# Patient Record
Sex: FEMALE | Race: BLACK OR AFRICAN AMERICAN
[De-identification: names, ages, dates, MRNs, and addresses within clinical notes are randomized per-mention and may not be internally consistent; named-entity substitution may affect disease eponyms.]

---

## 2019-09-06 ENCOUNTER — HOSPITAL ENCOUNTER (EMERGENCY)
Dept: HOSPITAL 53 - M ED | Age: 24
Discharge: HOME | End: 2019-09-06
Payer: COMMERCIAL

## 2019-09-06 VITALS — HEIGHT: 67 IN | BODY MASS INDEX: 36.61 KG/M2 | WEIGHT: 233.25 LBS

## 2019-09-06 VITALS — DIASTOLIC BLOOD PRESSURE: 76 MMHG | SYSTOLIC BLOOD PRESSURE: 127 MMHG

## 2019-09-06 DIAGNOSIS — H60.92: Primary | ICD-10-CM

## 2019-09-08 ENCOUNTER — HOSPITAL ENCOUNTER (EMERGENCY)
Dept: HOSPITAL 53 - M ED | Age: 24
Discharge: HOME | End: 2019-09-08
Payer: COMMERCIAL

## 2019-09-08 VITALS — WEIGHT: 231.71 LBS | BODY MASS INDEX: 36.37 KG/M2 | HEIGHT: 67 IN

## 2019-09-08 VITALS — DIASTOLIC BLOOD PRESSURE: 81 MMHG | SYSTOLIC BLOOD PRESSURE: 128 MMHG

## 2019-09-08 DIAGNOSIS — H60.92: Primary | ICD-10-CM

## 2019-09-08 DIAGNOSIS — Z79.899: ICD-10-CM

## 2019-09-08 DIAGNOSIS — Z79.2: ICD-10-CM

## 2019-09-10 ENCOUNTER — HOSPITAL ENCOUNTER (EMERGENCY)
Dept: HOSPITAL 53 - M ED | Age: 24
Discharge: HOME | End: 2019-09-10
Payer: COMMERCIAL

## 2019-09-10 VITALS — SYSTOLIC BLOOD PRESSURE: 134 MMHG | DIASTOLIC BLOOD PRESSURE: 86 MMHG

## 2019-09-10 VITALS — BODY MASS INDEX: 34.6 KG/M2 | HEIGHT: 67 IN | WEIGHT: 220.46 LBS

## 2019-09-10 DIAGNOSIS — H60.92: ICD-10-CM

## 2019-09-10 DIAGNOSIS — Z79.2: ICD-10-CM

## 2019-09-10 DIAGNOSIS — H92.01: Primary | ICD-10-CM

## 2019-09-10 DIAGNOSIS — Z79.899: ICD-10-CM

## 2020-05-07 ENCOUNTER — HOSPITAL ENCOUNTER (EMERGENCY)
Dept: HOSPITAL 53 - M ED | Age: 25
Discharge: HOME | End: 2020-05-07
Payer: COMMERCIAL

## 2020-05-07 VITALS
WEIGHT: 222.45 LBS | SYSTOLIC BLOOD PRESSURE: 135 MMHG | DIASTOLIC BLOOD PRESSURE: 71 MMHG | HEIGHT: 67 IN | BODY MASS INDEX: 34.91 KG/M2

## 2020-05-07 DIAGNOSIS — J02.9: Primary | ICD-10-CM

## 2021-03-12 ENCOUNTER — HOSPITAL ENCOUNTER (INPATIENT)
Dept: HOSPITAL 53 - M LDO | Age: 26
LOS: 3 days | Discharge: HOME | End: 2021-03-15
Attending: OBSTETRICS & GYNECOLOGY | Admitting: GENERAL PRACTICE
Payer: COMMERCIAL

## 2021-03-12 VITALS — WEIGHT: 270.29 LBS | HEIGHT: 67 IN | BODY MASS INDEX: 42.42 KG/M2

## 2021-03-12 VITALS — DIASTOLIC BLOOD PRESSURE: 67 MMHG | SYSTOLIC BLOOD PRESSURE: 121 MMHG

## 2021-03-12 DIAGNOSIS — E66.9: ICD-10-CM

## 2021-03-12 DIAGNOSIS — Z3A.39: ICD-10-CM

## 2021-03-12 DIAGNOSIS — D64.9: ICD-10-CM

## 2021-03-12 DIAGNOSIS — O41.03X0: Primary | ICD-10-CM

## 2021-03-13 VITALS — SYSTOLIC BLOOD PRESSURE: 120 MMHG | DIASTOLIC BLOOD PRESSURE: 76 MMHG

## 2021-03-13 VITALS — DIASTOLIC BLOOD PRESSURE: 58 MMHG | SYSTOLIC BLOOD PRESSURE: 123 MMHG

## 2021-03-13 VITALS — SYSTOLIC BLOOD PRESSURE: 117 MMHG | DIASTOLIC BLOOD PRESSURE: 61 MMHG

## 2021-03-13 VITALS — SYSTOLIC BLOOD PRESSURE: 128 MMHG | DIASTOLIC BLOOD PRESSURE: 67 MMHG

## 2021-03-13 VITALS — DIASTOLIC BLOOD PRESSURE: 61 MMHG | SYSTOLIC BLOOD PRESSURE: 129 MMHG

## 2021-03-13 VITALS — DIASTOLIC BLOOD PRESSURE: 58 MMHG | SYSTOLIC BLOOD PRESSURE: 126 MMHG

## 2021-03-13 VITALS — DIASTOLIC BLOOD PRESSURE: 75 MMHG | SYSTOLIC BLOOD PRESSURE: 126 MMHG

## 2021-03-13 VITALS — DIASTOLIC BLOOD PRESSURE: 68 MMHG | SYSTOLIC BLOOD PRESSURE: 129 MMHG

## 2021-03-13 VITALS — SYSTOLIC BLOOD PRESSURE: 127 MMHG | DIASTOLIC BLOOD PRESSURE: 77 MMHG

## 2021-03-13 VITALS — DIASTOLIC BLOOD PRESSURE: 77 MMHG | SYSTOLIC BLOOD PRESSURE: 131 MMHG

## 2021-03-13 VITALS — DIASTOLIC BLOOD PRESSURE: 65 MMHG | SYSTOLIC BLOOD PRESSURE: 133 MMHG

## 2021-03-13 VITALS — SYSTOLIC BLOOD PRESSURE: 132 MMHG | DIASTOLIC BLOOD PRESSURE: 80 MMHG

## 2021-03-13 VITALS — DIASTOLIC BLOOD PRESSURE: 73 MMHG | SYSTOLIC BLOOD PRESSURE: 131 MMHG

## 2021-03-13 VITALS — SYSTOLIC BLOOD PRESSURE: 130 MMHG | DIASTOLIC BLOOD PRESSURE: 77 MMHG

## 2021-03-13 VITALS — DIASTOLIC BLOOD PRESSURE: 80 MMHG | SYSTOLIC BLOOD PRESSURE: 131 MMHG

## 2021-03-13 VITALS — DIASTOLIC BLOOD PRESSURE: 58 MMHG | SYSTOLIC BLOOD PRESSURE: 117 MMHG

## 2021-03-13 LAB
BASE EXCESS BLDCOA CALC-SCNC: -4.8 MMOL/L
BASE EXCESS BLDCOV CALC-SCNC: -4.3 MMOL/L
CO2 BLDCOA CALC-SCNC: 23.9 MEQ/L
CO2 BLDCOV CALC-SCNC: 21.8 MEQ/L
HCO3 STD BLDCOA-SCNC: 20 MEQ/L
HCO3 STD BLDCOA-SCNC: 22.4 MEQ/L
HCO3 STD BLDCOV-SCNC: 20.6 MEQ/L
HCO3 STD BLDCOV-SCNC: 20.6 MEQ/L
HCT VFR BLD AUTO: 36.6 % (ref 36–47)
HGB BLD-MCNC: 11.2 G/DL (ref 12–15.5)
MCH RBC QN AUTO: 23.8 PG (ref 27–33)
MCHC RBC AUTO-ENTMCNC: 30.6 G/DL (ref 32–36.5)
MCV RBC AUTO: 77.7 FL (ref 80–96)
PCO2 BLDCOA: 49.4 MMHG
PCO2 BLDCOV: 37.7 MMHG
PH BLDCOA: 7.27 UNITS
PH BLDCOV: 7.36 UNITS
PLATELET # BLD AUTO: 243 10^3/UL (ref 150–450)
PO2 BLDCOA: 32.9 MMHG
PO2 BLDCOV: 38.3 MMHG
RBC # BLD AUTO: 4.71 10^6/UL (ref 4–5.4)
SAO2 % BLDCOA: 72.5 %
SAO2 % BLDCOV: 82.9 %
WBC # BLD AUTO: 10.6 10^3/UL (ref 4–10)

## 2021-03-13 PROCEDURE — 0HQ9XZZ REPAIR PERINEUM SKIN, EXTERNAL APPROACH: ICD-10-PCS | Performed by: OBSTETRICS & GYNECOLOGY

## 2021-03-13 RX ADMIN — SODIUM CHLORIDE, PRESERVATIVE FREE SCH ML: 5 INJECTION INTRAVENOUS at 22:20

## 2021-03-13 RX ADMIN — Medication SCH TAB: at 12:15

## 2021-03-13 RX ADMIN — Medication SCH MLS/HR: at 05:07

## 2021-03-13 RX ADMIN — Medication SCH MLS/HR: at 08:32

## 2021-03-13 NOTE — IPNPDOC
Obstetrical Progress Note


Date of Service


Mar 13, 2021





Subjective


To room for routine evaluation. Patient is coping well with her painful 

contractions.





Objective





Vital Signs








  Date Time  Temp Pulse Resp B/P (MAP) Pulse Ox O2 Delivery O2 Flow Rate FiO2


 


3/13/21 03:15 98.5 101 16 129/68 (88)    











Fetal Assessment


Fetal Heart Rate (FHR):  130


Variability:  Moderate


Accelerations:  Positive


Decelerations:  None


Fetal Heart Rate Tracing:  Category I





Tocometer


Contractions:  Yes


Frequency:  regular





Sterile Vaginal Examination


Dilation:  5 cm


Effacement (%):  70%


Station:  -2


Cervical Consistency:  Soft


Cervical Position:  Anterior


Fetal Postion/Presentation:  Cephalic presentation (by exam)





Assessment and Plan


Fetal Status:  Reassuring


Anticipate:  Vaginal Delivery


Additional Comments


CAT I tracing reactive. SVE 5/75/-2, AROM clear. Will continue to closely 

monitor and reassess in 2-4h or sooner if clinically indicated.











HUMES,JAMIE C. DO              Mar 13, 2021 05:48

## 2021-03-13 NOTE — HPEPDOC
Obstetrical History & Physical


General


Date of Admission


Mar 13, 2021 at 00:25





History of Present Illness


Ms. Ugalde is a 24yo  at 39+2 presents for contractions. She denied VB, LO

F, decreased FM. She has had contractions that are painful and regular since 

. She denied n/v/d, cp, sob, ha, visual changes, f/c, urinary sx.





Antepartum Course


Pre-Pregnancy weight (lbs.):  218


Admission Weight (lbs.):  271


Change in Weight (lbs.):  53





Past Medical History


Past Obstetrical History :  


   Past Obstetrical History:  Multigravida (G1 2017  40wk 7# c/b 3-4 MLL)


GYN History:  No pertinent history


Past Medical History


Medical History


anemia, obesity


Surgical History:  Denies/None





Family History


Significant Family History:  No pertinent family hx





Social History


Marital Status:  Single


Family situation:  Spouse/partner home


Psychosocial History:  No pertinent psych hx


* Smoker:  non-smoker


Alcohol:  Denies


Drugs:  denies





Prenatal Imunizations


Tdap status:  current


Influenza Status:  needs





Allergies


Coded Allergies:  


     No Known Allergies (Unverified , 19)





Medications


Scheduled


Prenatal No.137/Iron/Folic Acd (Prenatal Vitamin Tablet) 1 Each Tablet, 1 TAB PO

DAILY





Physical Examination


Physical Examination


GENERAL: Alert and oriented times three.


BREAST: .


ABDOMEN: Gravid and non-tender to touch.


FETUS: Is vertex (VTX) by sterile vaginal examination (SVE), fetus is vertex 

(VTX) by US


HEART RATE: Regular rate and rhythm.


LUNGS: Clear to auscultation (CTA).


EXTREMITIES: No edema. No clonus.





Laboratory Data


Urine Culture:  No Growth





Pertinent Laboratoy Data


Blood Type:  A+


RBC Antibody Screen:  Negative


HIV:  Negative


Hepatitis B:  Negative


Rapid Plasma Reagin:  Nonreactive


Rubella:  Immune


Varicella:  Immune


Chlamydia/Gonorrhea:  Negative


Group B Streptococcus:  Positive


Quad Screen Test:  Declined


Cystic Fibrosis:  Declined


Glucose Tolerance Test:  151





Anatomy Ultrasound


Placenta Location:  Anterior


Normal Anatomy:  Yes


Estimated Fetal Weight (grams):  3600





 Steroid Therapy


 Steroid Therapy:  No





Vaginal Examination


Dilation:  4 cm


Effacement:  50%


Station:  -3


Cervical Consistency:  Soft


Cervical Position:  Middle


Fetal Presentation:  Cephalic presentation (by US)





Fetal Assessment


Fetal Heart Rate (FHR):  140


Variability:  Moderate


Accelerations:  Positive


Decelerations:  Late





Tocometer


Contractions:  Yes


Multi-drug resistant Organism:  No history of MDRO





Assessment/Plan


Assessment


Ms. Ugalde is a 24yo  at 39+2 presents for contractions. She also had some

intermittent late decels, overall reassuring given intermittent and moderate 

variability and accels. On ultrasound she had oligohydraminos with an NAYANA of 

3.6cm. Decision was made to proceed with admission and augmentation of labor if 

indicated for oligohydraminos and CAT II FHT. She has normal VS.





APC


1. obesity with 50# gain


2. GBS poitive


3. anemia on Fe


4. elevated 1h GTT normal 3h GTT


5. history of 3-4 MLL





Rh pos, GBS POS, EFW 3600, ceph by US, placenta anterior, PP 7#





Plan


Admit and orient.


 and consent.


Diet: clears


Group B Streptococcus (GBS) [positive penicillin ordered].


Labs and intravenous (IV) per unit protocol.


Counseled on Pitocin and induction of labor (IOL).


Lactated Ringers (LR): saline lock, LR PRN as indicated 


Anticipate [normal spontaneous delivery ()].


C-S as appropriate.











HUMES,JAMIE C. DO              Mar 13, 2021 00:41

## 2021-03-14 VITALS — SYSTOLIC BLOOD PRESSURE: 125 MMHG | DIASTOLIC BLOOD PRESSURE: 64 MMHG

## 2021-03-14 VITALS — DIASTOLIC BLOOD PRESSURE: 78 MMHG | SYSTOLIC BLOOD PRESSURE: 116 MMHG

## 2021-03-14 LAB
HCT VFR BLD AUTO: 35.5 % (ref 36–47)
HGB BLD-MCNC: 10.9 G/DL (ref 12–15.5)
MCH RBC QN AUTO: 23.9 PG (ref 27–33)
MCHC RBC AUTO-ENTMCNC: 30.7 G/DL (ref 32–36.5)
MCV RBC AUTO: 77.9 FL (ref 80–96)
PLATELET # BLD AUTO: 231 10^3/UL (ref 150–450)
RBC # BLD AUTO: 4.56 10^6/UL (ref 4–5.4)
WBC # BLD AUTO: 11.1 10^3/UL (ref 4–10)

## 2021-03-14 RX ADMIN — IBUPROFEN PRN MG: 600 TABLET, FILM COATED ORAL at 15:22

## 2021-03-14 RX ADMIN — SODIUM CHLORIDE, PRESERVATIVE FREE SCH ML: 5 INJECTION INTRAVENOUS at 06:14

## 2021-03-14 RX ADMIN — Medication SCH TAB: at 09:51

## 2021-03-14 RX ADMIN — SODIUM CHLORIDE, PRESERVATIVE FREE SCH ML: 5 INJECTION INTRAVENOUS at 14:00

## 2021-03-14 NOTE — DN
DELIVERY NOTE



DATE OF DELIVERY: 2021

TIME OF BIRTH: 



GENDER: Female. 

APGARS: 9 and 9.



LACERATIONS: First-degree tear.



ANESTHESIA: none



ESTIMATED BLOOD LOSS:



COUNTS: correct



DESCRIPTION OF DELIVERY: This lady is a  2, para 1, admitted with

contractions and oligohydramnios. Had a spontaneous vaginal delivery of a live

birth female infant 7 pounds 8 ounces (3390 grams). Apgars of 9 and 9 at one

and five minutes respectively. Arterial pH was 7.27, base excess -4.8; venous

pH 7.35, base excess -4.3. Placenta delivered spontaneously with three-vessel

cord, membranes, and tissues intact. The uterus contracted well down on

Pitocin. The patient sustained a small first-degree tear, which was over-sewn

in the usual fashion with 2-0 J339. Sphincter was tight. Anterior, posterior,

and lateral walls complete. Cervix was normal. Again, the uterus contracted

well down on Pitocin. Patient and baby tolerating procedure well. 

KEN

## 2021-03-14 NOTE — IPNPDOC
Postpartum Progress Note


Date of Service:  Mar 14, 2021


Postpartum Day#:  1


Postpartum Progress Note











Item Value  Date Time


 


White Blood Count 11.1 10^3/uL H 3/14/21 0633


 


Red Blood Count 4.56 10^6/uL 3/14/21 0633


 


Hemoglobin 10.9 g/dl L 3/14/21 0633


 


Hematocrit 35.5 % L 3/14/21 0633


 


Mean Corpuscular Volume 77.9 fl L 3/14/21 0633


 


Mean Corpuscular Hemoglobin 23.9 pg L 3/14/21 0633


 


Mean Corpuscular Hemoglobin Concent 30.7 g/dl L 3/14/21 0633


 


Red Cell Distribution Width 16.1 % H 3/14/21 0633


 


Platelet Count 231 10^3/uL 3/14/21 0633


 


Platelet Count 243 10^3/uL 3/13/21 0032


 


Red Cell Distribution Width 15.9 % H 3/13/21 0032


 


Mean Corpuscular Hemoglobin Concent 30.6 g/dl L 3/13/21 003


 


Mean Corpuscular Hemoglobin 23.8 pg L 3/13/21 0032


 


Mean Corpuscular Volume 77.7 fl L 3/13/21 0032


 


Hematocrit 36.6 % 3/13/21 0032


 


Hemoglobin 11.2 g/dl L 3/13/21 0032


 


Red Blood Count 4.71 10^6/uL 3/13/21 0032


 


White Blood Count 10.6 10^3/uL H 3/13/21 0032








SUBJECT: 25-year-old  2 now Para 2 status post uncomplicated spontaneous 

vaginal delivery at 39.2 weeks' on 21 of a FEMALE 7pounds 8 ounces 3340 

grams) with post vaginal laceration and repair, doing well postpartum day # 1. 

She has been ambulating, voiding spontaneously without issue and tolerating 

regular diet. Breast feeding without issue. Reports lochia is [like a normal 

period]. Patient is ambulating well. [Reports some cramping with breastfeeding. 

Denies any pain. Voiding and stooling without difficulty].





OBJECTIVE: 


VITAL SIGNS: Within normal limits, afebrile.


Alert and oriented times three.


Breath sounds clear to auscultation.


Heart rate: Regular rate and rhythm, no murmurs, rubs or gallops.


Abdomen: Fundus firm at U-2. Soft, NTTP.


[Minimal] lochia.





ASSESSMENT: 25-year-old  2 now Para 2 status post uncomplicated 

spontaneous vaginal delivery IOL OLIGOHYDRAMNIOS with spontaneous rupture of 

membranes (SROM), delivered AT 39.2 weeks', doing well on postpartum day 1 

Vitals within normal limits, afebrile, hemodynamically stable with no evidence 

of infection.





PLAN:


1. Discharge to home TOMORROW


2. Tylenol and Motrin for pain.


3. Encourage breast feeding and ambulation.


4. CONTRACEPTIONAT 6 WEEK VISIT


5. Routine PP visit in 6 weeks in clinic.


6. Discussed return precautions at length.





VS, I&O, 24H, Fishbone


Vital Signs/I&O





Vital Signs








  Date Time  Temp Pulse Resp B/P (MAP) Pulse Ox O2 Delivery O2 Flow Rate FiO2


 


3/14/21 06:00 97.9 97 16 125/64 (84) 99 Room Air  














I&O- Last 24 Hours up to 6 AM 


 


 3/14/21





 06:00


 


Intake Total 2225 ml


 


Output Total 1600 ml


 


Balance 625 ml











Laboratory Data


24H LABS


Laboratory Tests 2


3/13/21 09:56: 


Cord Arterial Blood pH 7.274, Cord Arterial Blood PCO2 49.4, Cord Arterial Blood

 PO2 32.9, Cord Arterial Blood HCO3 22.4, Cord Arterial Blood Total CO2 23.9, 

Cord Arterial Blood Base Excess -4.8, Cord Arterial Base Excess (Standard 20.0, 

Cord Arterial Bld Oxygen Saturation 72.5, Cord Venous Blood pH 7.356, Cord 

Venous Blood PCO2 37.7, Cord Venous Blood PO2 38.3, Cord Venous Blood HCO3 20.6,

 Cord Venous Blood Total CO2 21.8, Cord Venous Base Excess (Actual) -4.3, Cord 

Venous Base Excess (Standard) 20.6, Cord Venous Blood Oxygen Saturation 82.9


3/14/21 06:33: Nucleated Red Blood Cells % (auto) 0.0


CBC/BMP


Laboratory Tests


3/14/21 06:33

















Otoniel Mejia MD             Mar 14, 2021 07:45

## 2021-03-15 VITALS — SYSTOLIC BLOOD PRESSURE: 114 MMHG | DIASTOLIC BLOOD PRESSURE: 58 MMHG

## 2021-03-15 RX ADMIN — Medication SCH TAB: at 08:36

## 2021-03-15 RX ADMIN — IBUPROFEN PRN MG: 600 TABLET, FILM COATED ORAL at 05:46

## 2021-03-15 NOTE — DSES
DISCHARGE SUMMARY



DATE OF ADMISSION:  2021

DATE OF DISCHARGE:  03/15/2021



BRIEF HISTORY:  This lady is a 25-year-old  2 now para 2 admitted with

contractions and oligohydramnios. She had a spontaneous vaginal delivery, live

birth female infant, 7 pounds, 8 ounces, 3390 grams, Apgars of 9 and 9 at 1 and

5 minutes respectively. Arterial pH 7.27, base excess -4.8, venous pH 7.35,

base excess -4.3. Her issues were GBS positive, oligohydramnios, she had a

first degree tear that was repaired in the usual fashion. On discharge, her

blood pressure was 114/58, respirations are 18, pulse was 91, temperature 97.9.

Admitting hemoglobin was 11.2, hematocrit 36.6 and platelets are 243,000.

Discharge hemoglobin 10.9 and hematocrit 35.5 and platelets were 231,000. 



On her second postpartum day we discussed phlebitis, cystitis, mastitis,

endometritis, cellulitis, diet, exercise, pain management, perineal, breast and

wound care. The rest of the examination was unremarkable. Normocephalic,

atraumatic. Neck with full range of motion. Pupils equal and reactive to light.

Distal pulses symmetric. No evidence of DVT,  PE or superficial phlebitis.

Chest was clear bilaterally at bases. No wheezes or rhonchi. No CVA tenderness.

Abdomen was soft. Four quadrant bowel sounds are noted. Uterus is 2 below.

Lochia is moderate. Perineum is healing. No rashes, lesions or pruritus, no

arthralgias or myalgias. No complaint of joint pain. No complaint of cough,

wheeze, shortness of breath or dyspnea on exertion. No nausea, vomiting,

diarrhea or constipation. No urgency or frequency. 



SUMMARY: I have a term gestation, delivered a live birth female infant. Plans

are for pickup medications at [facility], six week postpartum checkup at [facility]. All questions were answered, a 20 minute discussion. Patient was discharged

improved. 





cc: [facility]